# Patient Record
Sex: FEMALE | URBAN - METROPOLITAN AREA
[De-identification: names, ages, dates, MRNs, and addresses within clinical notes are randomized per-mention and may not be internally consistent; named-entity substitution may affect disease eponyms.]

---

## 2023-05-12 ENCOUNTER — OFFICE VISIT (OUTPATIENT)
Dept: URBAN - METROPOLITAN AREA CLINIC 17 | Facility: CLINIC | Age: 53
End: 2023-05-12
Payer: COMMERCIAL

## 2023-05-12 DIAGNOSIS — H35.372 PUCKERING OF MACULA, LEFT EYE: ICD-10-CM

## 2023-05-12 DIAGNOSIS — Z79.899 OTHER LONG TERM (CURRENT) DRUG THERAPY: Primary | ICD-10-CM

## 2023-05-12 DIAGNOSIS — H04.123 DRY EYE SYNDROME OF BILATERAL LACRIMAL GLANDS: ICD-10-CM

## 2023-05-12 PROCEDURE — 92134 CPTRZ OPH DX IMG PST SGM RTA: CPT

## 2023-05-12 PROCEDURE — 99204 OFFICE O/P NEW MOD 45 MIN: CPT

## 2023-05-12 ASSESSMENT — INTRAOCULAR PRESSURE
OD: 14
OS: 15

## 2023-05-12 NOTE — IMPRESSION/PLAN
Impression: Other long term (current) drug therapy: Z79.899.
-currently on 200mg BID plaquenil for plaquenil
-started 1 month ago
-weighs 161lbs and is 5 feet 4 inches -MAC OCT ordered showing no toxicity Plan: Patient educated on ocular findings. At this time, there is no evidence of plaquenil maculopathy as seen on fundus exam and additional testing. The patient was counseled as to the potential risk associated with Plaquenil use and the importance of yearly monitoring for any evidence of maculopathy. The patient understands that Plaquenil may cause irreversible and progressive retinal toxicity leading to loss of vision; this risk increases to approximately 1% after 5 years of therapy. We discussed that the goal of all screening methods and exams is to detect any early signs of plaquenil maculopathy; however, even early maculopathy caused by plaquenil may be irreversible. The patient understands and accepts the risk associated with Plaquenil therapy and the importance of yearly follow-up.

## 2023-05-12 NOTE — IMPRESSION/PLAN
Impression: Dry eye syndrome of bilateral lacrimal glands: H04.123.
-(+)sjogrens Plan: Patient educated on ocular findings. Recommended patient to use preservative free artificial tears 3-4x a day , perform warm compresses with digital massage once a day, and to use gel drops at night. Continue to monitor.